# Patient Record
Sex: FEMALE
[De-identification: names, ages, dates, MRNs, and addresses within clinical notes are randomized per-mention and may not be internally consistent; named-entity substitution may affect disease eponyms.]

---

## 2024-01-02 ENCOUNTER — HOSPITAL ENCOUNTER (OUTPATIENT)
Facility: HOSPITAL | Age: 19
Setting detail: RECURRING SERIES
Discharge: HOME | End: 2024-01-05
Payer: OTHER GOVERNMENT

## 2024-01-02 PROCEDURE — 97535 SELF CARE MNGMENT TRAINING: CPT

## 2024-01-02 PROCEDURE — 97161 PT EVAL LOW COMPLEX 20 MIN: CPT

## 2024-01-02 PROCEDURE — 97110 THERAPEUTIC EXERCISES: CPT

## 2024-01-02 NOTE — PROGRESS NOTES
[facility]  [address] [facility] [address] [place] [address] Ph:[phone] Fx: [phone]    PLAN OF CARE/ Statement of Necessity for Physical Therapy Services           Patient name: Maame Samson Start of Care: 2024   Referral source: Angel Roberts MD : 2005    Medical Diagnosis: Right knee pain [M25.561]       Onset Date: 23   Treatment Diagnosis: M25.562  LEFT KNEE PAIN                                      Prior Hospitalization: see medical history Provider#: [id number]   Medications: Verified on Patient Summary List     Comorbidities: none  Prior Level of Function: functionally independent, no AD, active lifestyle as collegiate      The Plan of Care and following information is based on the information from the initial evaluation.    Assessment / key information:    17 yo female who presents to [facility] with c/o left knee pain. Patient injured knee during  soccer game on 23. Patient received XRAY and MRI, showing results consistent with ACL rupture per MD. Pt is planning on undergoing surgical repair/reconstruction ASAP but needs improved ROM prior to operation per MD via pt. Patient reports no pain at rest or with normal activities, although mild and intermittent increase in pain during twisting motions and when changing direction. Patient demonstrates decreased ROM relative to right knee but with severe bilateral hypermobility, decreased strength of hip abd and ankle stabilization muscles, impaired gait mechancis, pain and decreased functional mobility tolerance.     Patient will continue to benefit from skilled PT services to modify and progress therapeutic interventions, address functional mobility deficits, address ROM deficits, address strength deficits, analyze and address soft tissue restrictions, analyze and cue movement patterns, and analyze and modify body mechanics/ergonomics  to attain remaining 
surgery  Eval Status:          AROM    PROM   Knee Left Right Left Right   Extension 0 -3 -4 -11   Flexion 110 (painful) 120 114 (painful) 136       3.   Pt will have 4+/5 left hip abd strength to improve left LE stability for reduced risk of injury during rehab after surgery.  Eval Status: 4/5    4.   Patient will improve pain at worst throughout the day to </= 2/10 improve functional mobility tolerance for return to school without pain.   Eval Status: 5-6/10 at worst    PLAN  [x]  Upgrade activities as tolerated     [x]  Continue plan of care  [x]  Update interventions per flow sheet       []  Discharge due to:_  []  Other:_      Vaughn Julian PT, DPT 1/2/2024  2:49 PM

## 2024-01-04 ENCOUNTER — HOSPITAL ENCOUNTER (OUTPATIENT)
Facility: HOSPITAL | Age: 19
Setting detail: RECURRING SERIES
Discharge: HOME | End: 2024-01-07
Payer: OTHER GOVERNMENT

## 2024-01-04 PROCEDURE — 97110 THERAPEUTIC EXERCISES: CPT

## 2024-01-04 PROCEDURE — 97112 NEUROMUSCULAR REEDUCATION: CPT

## 2024-01-04 PROCEDURE — 97530 THERAPEUTIC ACTIVITIES: CPT

## 2024-01-04 NOTE — PROGRESS NOTES
PHYSICAL / OCCUPATIONAL THERAPY - DAILY TREATMENT NOTE (updated )    Patient Name: Maame Samson    Date: 2024    : 2005  Insurance: Payor: [employer] / Plan: [employer] / Product Type: *No Product type* /      Patient  verified Yes     Visit #   Current / Total 2 4   Time   In / Out 909 948   Pain   In / Out 0 0   Subjective Functional Status/Changes: Pt reports she has an MD appointment for her left knee on 24.   Changes to:  Allergies, Med Hx, Sx Hx?   no       TREATMENT AREA =  Right knee pain [M25.561]    OBJECTIVE    Therapeutic Procedures:  Tx Min Billable or 1:1 Min (if diff from Tx Min) Procedure, Rationale, Specifics   16  25400 Therapeutic Exercise (timed):  increase ROM, strength, coordination, balance, and proprioception to improve patient's ability to progress to PLOF and address remaining functional goals. (see flow sheet as applicable)    Details if applicable:       15  83734 Neuromuscular Re-Education (timed):  improve balance, coordination, kinesthetic sense, posture, core stability and proprioception to improve patient's ability to develop conscious control of individual muscles and awareness of position of extremities in order to progress to PLOF and address remaining functional goals. (see flow sheet as applicable)    Details if applicable:     8  87971 Therapeutic Activity (timed):  use of dynamic activities replicating functional movements to increase ROM, strength, coordination, balance, and proprioception in order to improve patient's ability to progress to PLOF and address remaining functional goals.  (see flow sheet as applicable)     Details if applicable:           Details if applicable:            Details if applicable:     39  [facility] Totals Reminder: bill using total billable min of TIMED therapeutic procedures (example: do not include dry needle or estim unattended, both untimed codes, in totals to left)  8-22 min = 1 unit; 23-37 min = 2 units; 38-52 min

## 2024-01-08 ENCOUNTER — HOSPITAL ENCOUNTER (OUTPATIENT)
Facility: HOSPITAL | Age: 19
Setting detail: RECURRING SERIES
Discharge: HOME | End: 2024-01-11
Payer: OTHER GOVERNMENT

## 2024-01-08 PROCEDURE — 97530 THERAPEUTIC ACTIVITIES: CPT

## 2024-01-08 PROCEDURE — 97112 NEUROMUSCULAR REEDUCATION: CPT

## 2024-01-08 PROCEDURE — 97110 THERAPEUTIC EXERCISES: CPT

## 2024-01-08 NOTE — PROGRESS NOTES
PROM              Knee Left Right Left Right   Extension 0 -3 -4 -11   Flexion 110 (painful) 120 114 (painful) 136    Current: progressing 1/4/24 AROM left knee 0-121 deg (1-2/10 pain at end range), PROM left knee flex 121 deg; 0 deg AROM left knee extension 1/9/24  124 PROM left knee flexion 1/9/24     3.   Pt will have 4+/5 left hip abd strength to improve left LE stability for reduced risk of injury during rehab after surgery.  Eval Status: 4/5     4.   Patient will improve pain at worst throughout the day to </= 2/10 improve functional mobility tolerance for return to school without pain.   Eval Status: 5-6/10 at worst  Current: patient denies pain when cleaning 1/9/24, notes increased pain only when twisting/turning and describes it more as \"discomfort\" 1/9/24    PLAN  Yes  Continue plan of care  []  Upgrade activities as tolerated  []  Discharge due to :  []  Other:    Lis Coulter PTA    1/8/2024    10:01 AM    Future Appointments   Date Time Provider Department Center   1/8/2024  2:30 PM [facility]   1/9/2024  7:10 AM Navya Reed PTA [facility]

## 2024-01-09 ENCOUNTER — HOSPITAL ENCOUNTER (OUTPATIENT)
Facility: HOSPITAL | Age: 19
Setting detail: RECURRING SERIES
Discharge: HOME | End: 2024-01-12
Payer: OTHER GOVERNMENT

## 2024-01-09 PROCEDURE — 97530 THERAPEUTIC ACTIVITIES: CPT

## 2024-01-09 PROCEDURE — 97110 THERAPEUTIC EXERCISES: CPT

## 2024-01-09 PROCEDURE — 97112 NEUROMUSCULAR REEDUCATION: CPT

## 2024-01-09 NOTE — PROGRESS NOTES
PHYSICAL / OCCUPATIONAL THERAPY - DAILY TREATMENT NOTE     Patient Name: Maame Samson    Date: 2024    : 2005  Insurance: Payor:  EAST / Plan:  [employer] / Product Type: *No Product type* /      Patient  verified Yes     Visit #   Current / Total 4 4   Time   In / Out 7:10 7:50   Pain   In / Out 0 0   Subjective Functional Status/Changes: Pt reports no new complaints of pain.    Changes to:  Allergies, Med Hx, Sx Hx?   no       TREATMENT AREA =  Right knee pain [M25.561]    OBJECTIVE    Therapeutic Procedures:  Tx Min Billable or 1:1 Min (if diff from Tx Min) Procedure, Rationale, Specifics   24  70767 Therapeutic Exercise (timed):  increase ROM, strength, coordination, balance, and proprioception to improve patient's ability to progress to PLOF and address remaining functional goals. (see flow sheet as applicable)    Details if applicable:       8  12319 Neuromuscular Re-Education (timed):  improve balance, coordination, kinesthetic sense, posture, core stability and proprioception to improve patient's ability to develop conscious control of individual muscles and awareness of position of extremities in order to progress to PLOF and address remaining functional goals. (see flow sheet as applicable)    Details if applicable:     8  10307 Therapeutic Activity (timed):  use of dynamic activities replicating functional movements to increase ROM, strength, coordination, balance, and proprioception in order to improve patient's ability to progress to PLOF and address remaining functional goals.  (see flow sheet as applicable)     Details if applicable:     40  [facility] Totals Reminder: bill using total billable min of TIMED therapeutic procedures (example: do not include dry needle or estim unattended, both untimed codes, in totals to left)  8-22 min = 1 unit; 23-37 min = 2 units; 38-52 min = 3 units; 53-67 min = 4 units; 68-82 min = 5 units   Total Total     TOTAL TREATMENT TIME:        40     [x]